# Patient Record
Sex: MALE | Race: WHITE | NOT HISPANIC OR LATINO | Employment: OTHER | URBAN - METROPOLITAN AREA
[De-identification: names, ages, dates, MRNs, and addresses within clinical notes are randomized per-mention and may not be internally consistent; named-entity substitution may affect disease eponyms.]

---

## 2023-11-15 ENCOUNTER — FOLLOW-UP (OUTPATIENT)
Dept: OPHTHALMOLOGY | Facility: CLINIC | Age: 73
End: 2023-11-15
Payer: MEDICARE

## 2023-11-15 DIAGNOSIS — H35.3211 EXUDATIVE AGE-RELATED MACULAR DEGENERATION OF RIGHT EYE WITH ACTIVE CHOROIDAL NEOVASCULARIZATION (MULTI): Primary | ICD-10-CM

## 2023-11-15 DIAGNOSIS — H46.9 OPTIC NEUROPATHY, RIGHT: ICD-10-CM

## 2023-11-15 PROCEDURE — 99213 OFFICE O/P EST LOW 20 MIN: CPT

## 2023-11-15 PROCEDURE — 92134 CPTRZ OPH DX IMG PST SGM RTA: CPT | Mod: BILATERAL PROCEDURE

## 2023-11-15 RX ORDER — CLOPIDOGREL BISULFATE 75 MG/1
TABLET, FILM COATED ORAL
COMMUNITY
Start: 2023-10-06

## 2023-11-15 RX ORDER — METOPROLOL TARTRATE 100 MG/1
100 TABLET ORAL EVERY 12 HOURS
COMMUNITY
Start: 2023-11-02

## 2023-11-15 RX ORDER — GLIMEPIRIDE 1 MG/1
1 TABLET ORAL
COMMUNITY
Start: 2023-11-07

## 2023-11-15 RX ORDER — ROSUVASTATIN CALCIUM 20 MG/1
20 TABLET, COATED ORAL
COMMUNITY
Start: 2023-11-02

## 2023-11-15 ASSESSMENT — VISUAL ACUITY
OD_CC: CF 1FT
METHOD: SNELLEN - LINEAR
CORRECTION_TYPE: GLASSES
OS_CC: 20/30-2

## 2023-11-15 ASSESSMENT — CONF VISUAL FIELD
OS_SUPERIOR_TEMPORAL_RESTRICTION: 0
OD_SUPERIOR_TEMPORAL_RESTRICTION: 0
OS_INFERIOR_NASAL_RESTRICTION: 0
OS_NORMAL: 1
OD_INFERIOR_NASAL_RESTRICTION: 0
OD_NORMAL: 1
OS_SUPERIOR_NASAL_RESTRICTION: 0
OS_INFERIOR_TEMPORAL_RESTRICTION: 0
OD_SUPERIOR_NASAL_RESTRICTION: 0
OD_INFERIOR_TEMPORAL_RESTRICTION: 0

## 2023-11-15 ASSESSMENT — CUP TO DISC RATIO
OD_RATIO: 0.3
OS_RATIO: 0.3

## 2023-11-15 ASSESSMENT — TONOMETRY
IOP_METHOD: GOLDMANN APPLANATION
OS_IOP_MMHG: 19
OD_IOP_MMHG: 19

## 2023-11-15 ASSESSMENT — SLIT LAMP EXAM - LIDS
COMMENTS: NORMAL
COMMENTS: NORMAL

## 2023-11-15 ASSESSMENT — EXTERNAL EXAM - RIGHT EYE: OD_EXAM: NORMAL

## 2023-11-15 ASSESSMENT — EXTERNAL EXAM - LEFT EYE: OS_EXAM: NORMAL

## 2023-11-15 NOTE — PROGRESS NOTES
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   - stable exam today with worsening SRF.    - Discussed diagnosis with patient, as well as need for treatment. Extensive discussion that goal is maintenance of current vision, not improvement in vision.  - OD s/p CHUN OD 4/26/23  - OD s/p IVV 6/14/23, 07/26/23   - stable - patient noted minimal improvement with most recent IVV    OCT macula  OD subfoveal PED, with stable persistent SRF, scattered drusen  OS scattered drusen, no IRF/SRF      OCT-A (prior)   OD notched PED with low-grade flow in the notch, c/w chronic CNVM   OS within normal limits    #history of optic neuropathy  - questionable based on clinical exam of optic nerve  - patient does not recall inciting event but endorses 4-5 year vision decline    Plan:  - discussed options with patient - given minimal symptomatic improvement and stability of OCT findings over the past 4 months without injection, will elect to defer  - vision is CF OD, and with optic nerve pallor - possible history of optic neuropathy which is underlying cause of vision loss  - follow-up in 6 weeks with Dr. Salazar - for possible re-injection vs observation  - continue AREDS2 and [employer] testing

## 2023-12-26 NOTE — PROGRESS NOTES
#Wet AMD, OD H35.3218   - Previously followed at [facility], without records available for review.   - stable exam today with worsening SRF.    - Discussed diagnosis with patient, as well as need for treatment. Extensive discussion that goal is maintenance of current vision, not improvement in vision.  - OD s/p CHUN OD 4/26/23  - OD s/p IVV 6/14/23, 07/26/23   - stable - patient noted minimal improvement with most recent IVV    OCT macula  OD subfoveal PED, with stable persistent SRF, scattered drusen  OS scattered drusen, no IRF/SRF      OCT-A (prior)   OD notched PED with low-grade flow in the notch, c/w chronic CNVM   OS within normal limits    #history of optic neuropathy  - questionable based on clinical exam of optic nerve  - patient does not recall inciting event but endorses 4-5 year vision decline    Plan:  - discussed options with patient - given minimal symptomatic improvement and stability of OCT findings over the past 4 months without injection, will elect to defer  - vision is CF OD, and with optic nerve pallor - possible history of optic neuropathy which is underlying cause of vision loss  - follow-up in 6 weeks with Dr. Salazar - for possible re-injection vs observation  - continue AREDS2 and [employer] testing

## 2023-12-27 ENCOUNTER — APPOINTMENT (OUTPATIENT)
Dept: OPHTHALMOLOGY | Facility: CLINIC | Age: 73
End: 2023-12-27
Payer: MEDICARE

## 2024-01-03 ENCOUNTER — FOLLOW-UP (OUTPATIENT)
Dept: OPHTHALMOLOGY | Facility: CLINIC | Age: 74
End: 2024-01-03
Payer: MEDICARE

## 2024-01-03 DIAGNOSIS — H35.3211 EXUDATIVE AGE-RELATED MACULAR DEGENERATION OF RIGHT EYE WITH ACTIVE CHOROIDAL NEOVASCULARIZATION (MULTI): Primary | ICD-10-CM

## 2024-01-03 DIAGNOSIS — H46.9 OPTIC NEUROPATHY, RIGHT: ICD-10-CM

## 2024-01-03 PROCEDURE — 92134 CPTRZ OPH DX IMG PST SGM RTA: CPT | Mod: BILATERAL PROCEDURE | Performed by: OPHTHALMOLOGY

## 2024-01-03 PROCEDURE — 99213 OFFICE O/P EST LOW 20 MIN: CPT | Performed by: OPHTHALMOLOGY

## 2024-01-03 RX ORDER — AMLODIPINE BESYLATE 10 MG/1
TABLET ORAL
COMMUNITY
Start: 2023-11-02

## 2024-01-03 RX ORDER — ASPIRIN 81 MG/1
TABLET ORAL
COMMUNITY

## 2024-01-03 ASSESSMENT — ENCOUNTER SYMPTOMS
EYES NEGATIVE: 0
GASTROINTESTINAL NEGATIVE: 0
CARDIOVASCULAR NEGATIVE: 0
ALLERGIC/IMMUNOLOGIC NEGATIVE: 0
MUSCULOSKELETAL NEGATIVE: 0
HEMATOLOGIC/LYMPHATIC NEGATIVE: 0
CONSTITUTIONAL NEGATIVE: 0
NEUROLOGICAL NEGATIVE: 0
PSYCHIATRIC NEGATIVE: 0
ENDOCRINE NEGATIVE: 0
RESPIRATORY NEGATIVE: 0

## 2024-01-03 ASSESSMENT — CUP TO DISC RATIO
OS_RATIO: 0.3
OD_RATIO: 0.3

## 2024-01-03 ASSESSMENT — VISUAL ACUITY
METHOD: SNELLEN - LINEAR
CORRECTION_TYPE: GLASSES
OS_CC: 20/20
OD_CC: CF@1FT

## 2024-01-03 ASSESSMENT — EXTERNAL EXAM - LEFT EYE: OS_EXAM: NORMAL

## 2024-01-03 ASSESSMENT — TONOMETRY
OS_IOP_MMHG: 14
IOP_METHOD: GOLDMANN APPLANATION
OD_IOP_MMHG: 13

## 2024-01-03 ASSESSMENT — SLIT LAMP EXAM - LIDS
COMMENTS: NORMAL
COMMENTS: NORMAL

## 2024-01-03 ASSESSMENT — EXTERNAL EXAM - RIGHT EYE: OD_EXAM: NORMAL

## 2024-01-03 NOTE — PROGRESS NOTES
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    - stable exam today with stable SRF.    - Discussed diagnosis with patient, as well as need for treatment. Extensive discussion that goal is maintenance of current vision, not improvement in vision.  - OD s/p CHUN OD 4/26/23  - OD s/p IVV 6/14/23, 07/26/23   - stable - patient noted minimal improvement subjectively and no objective changes in subretinal fluid (SRF) with most recent IVV in 07/2023     OCT macula 01/03/24  OD subfoveal PED, with stable SRF, scattered drusen  OS scattered drusen, no IRF/SRF       OCT-A (prior)   OD notched PED with low-grade flow in the notch, c/w chronic CNVM   OS within normal limits     #history of optic neuropathy  - questionable based on clinical exam of optic nerve  - patient does not recall inciting event but endorses 4-5 year vision decline     Plan:  - discussed options with patient - given minimal symptomatic improvement and stability of OCT findings over the past 4 months without injection, will continue to defer  - vision is CF OD, and with optic nerve pallor - possible history of optic neuropathy which is underlying cause of vision loss  - referral to neurophthalmology for rule out (r/o) traumatic optic neuropathy.   - follow-up in 3 mos  - continue AREDS2 and [employer] testing

## 2024-01-24 ENCOUNTER — OFFICE VISIT (OUTPATIENT)
Dept: OPHTHALMOLOGY | Facility: CLINIC | Age: 74
End: 2024-01-24
Payer: MEDICARE

## 2024-01-24 DIAGNOSIS — S80.859A: ICD-10-CM

## 2024-01-24 DIAGNOSIS — H47.20 RIGHT OPTIC ATROPHY: Primary | ICD-10-CM

## 2024-01-24 PROCEDURE — 92083 EXTENDED VISUAL FIELD XM: CPT | Performed by: PSYCHIATRY & NEUROLOGY

## 2024-01-24 PROCEDURE — 92133 CPTRZD OPH DX IMG PST SGM ON: CPT | Performed by: PSYCHIATRY & NEUROLOGY

## 2024-01-24 PROCEDURE — 1160F RVW MEDS BY RX/DR IN RCRD: CPT | Performed by: PSYCHIATRY & NEUROLOGY

## 2024-01-24 PROCEDURE — 99215 OFFICE O/P EST HI 40 MIN: CPT | Performed by: PSYCHIATRY & NEUROLOGY

## 2024-01-24 PROCEDURE — 1159F MED LIST DOCD IN RCRD: CPT | Performed by: PSYCHIATRY & NEUROLOGY

## 2024-01-24 RX ORDER — LISINOPRIL AND HYDROCHLOROTHIAZIDE 20; 25 MG/1; MG/1
1 TABLET ORAL
COMMUNITY
Start: 2024-01-16

## 2024-01-24 RX ORDER — ROSUVASTATIN CALCIUM 40 MG/1
TABLET, COATED ORAL
COMMUNITY
Start: 2024-01-16

## 2024-01-24 ASSESSMENT — ENCOUNTER SYMPTOMS
CARDIOVASCULAR NEGATIVE: 0
PSYCHIATRIC NEGATIVE: 0
ENDOCRINE NEGATIVE: 0
NEUROLOGICAL NEGATIVE: 0
EYES NEGATIVE: 1
ALLERGIC/IMMUNOLOGIC NEGATIVE: 0
HEMATOLOGIC/LYMPHATIC NEGATIVE: 0
GASTROINTESTINAL NEGATIVE: 0
MUSCULOSKELETAL NEGATIVE: 0
RESPIRATORY NEGATIVE: 0
CONSTITUTIONAL NEGATIVE: 0

## 2024-01-24 ASSESSMENT — CONF VISUAL FIELD
OS_INFERIOR_NASAL_RESTRICTION: 0
OS_SUPERIOR_NASAL_RESTRICTION: 0
OD_INFERIOR_NASAL_RESTRICTION: 1
OS_NORMAL: 1
OS_INFERIOR_TEMPORAL_RESTRICTION: 0
OD_SUPERIOR_TEMPORAL_RESTRICTION: 1
OD_SUPERIOR_NASAL_RESTRICTION: 1
OS_SUPERIOR_TEMPORAL_RESTRICTION: 0
OD_INFERIOR_TEMPORAL_RESTRICTION: 1
METHOD: COUNTING FINGERS

## 2024-01-24 ASSESSMENT — REFRACTION_WEARINGRX
SPECS_TYPE: PAL
OS_SPHERE: +2.50
OD_ADD: +3.00
OS_AXIS: 105
OD_CYLINDER: -0.75
OS_CYLINDER: -0.75
OD_SPHERE: +3.00
OS_ADD: +3.00
OD_AXIS: 125

## 2024-01-24 ASSESSMENT — TONOMETRY
OS_IOP_MMHG: 12
IOP_METHOD: GOLDMANN APPLANATION
OD_IOP_MMHG: 16

## 2024-01-24 ASSESSMENT — VISUAL ACUITY
METHOD: SNELLEN - LINEAR
OS_CC: 20/20
OS_CC+: -1
OD_CC: CF @ 1FT
CORRECTION_TYPE: GLASSES

## 2024-01-24 ASSESSMENT — CUP TO DISC RATIO
OS_RATIO: 0.3
OD_RATIO: 0.3

## 2024-01-24 ASSESSMENT — SLIT LAMP EXAM - LIDS
COMMENTS: PTOSIS 3 MM
COMMENTS: NORMAL

## 2024-01-24 ASSESSMENT — EXTERNAL EXAM - RIGHT EYE: OD_EXAM: NORMAL

## 2024-01-24 ASSESSMENT — EXTERNAL EXAM - LEFT EYE: OS_EXAM: NORMAL

## 2024-01-24 NOTE — PROGRESS NOTES
Assessment and Plan    1/24/2024 Michelle OD 21 & OS 21 at base [facility].    1/24/2024 OCT RNFL OD 62 with S & I thinning & OS 92.    1/24/2024 HVF 24-2 OD stimulus V, fovea <0, generalized depression & OS fovea 37, wnl MD +1.32.    This 73 year-old man with a history of exudative age-related macular degeneration (ARMD), hypertension, hyperlipidemia, type 2 diabetes, CKD presents for evaluation of right optic neuropathy.    He has profound right optic atrophy. The differential of optic atrophy is broad including compressive lesions, optic neuritis, nutritional deficiencies, chronic/latent infections. As a diagnosis of exclusion, non-arteritic anterior ischemic optic neuropathy is a consideration, but typically has a distinct sudden loss as part of that illness. I will test for the possibilties. I also will follow over time to try to establish chronicity.     Plan    Check MRI brain & orbits with contrast  Check NMO/AQP4 & MOG antibodies, B12, folate, thiamine, syphilis antibody, T-SPOT & ACE    Follow up in 1-2 months (HVF & OCT in longer term). (Dilated 1/24/2024)  ---Addendum 2/9/2024---  I was informed that he needs imaging with X-rays before the MRI secondary to metal in his leg. He confirmed metal in the left leg calf.

## 2024-03-04 ENCOUNTER — APPOINTMENT (OUTPATIENT)
Dept: OPHTHALMOLOGY | Facility: CLINIC | Age: 74
End: 2024-03-04
Payer: MEDICARE

## 2024-04-03 ENCOUNTER — APPOINTMENT (OUTPATIENT)
Dept: OPHTHALMOLOGY | Facility: CLINIC | Age: 74
End: 2024-04-03
Payer: MEDICARE

## 2024-04-17 ENCOUNTER — APPOINTMENT (OUTPATIENT)
Dept: OPHTHALMOLOGY | Facility: CLINIC | Age: 74
End: 2024-04-17
Payer: MEDICARE

## 2024-05-14 ENCOUNTER — DOCUMENTATION (OUTPATIENT)
Dept: OPHTHALMOLOGY | Facility: HOSPITAL | Age: 74
End: 2024-05-14
Payer: MEDICARE

## 2024-05-14 ENCOUNTER — APPOINTMENT (OUTPATIENT)
Dept: CARDIOLOGY | Facility: HOSPITAL | Age: 74
End: 2024-05-14
Payer: MEDICARE

## 2024-05-14 DIAGNOSIS — H35.3211 EXUDATIVE AGE-RELATED MACULAR DEGENERATION OF RIGHT EYE WITH ACTIVE CHOROIDAL NEOVASCULARIZATION (MULTI): ICD-10-CM

## 2024-05-14 DIAGNOSIS — H47.20 RIGHT OPTIC ATROPHY: Primary | ICD-10-CM

## 2024-05-14 NOTE — PROGRESS NOTES
#Wet AMD, OD H35.3211   - Previously followed at [facility], was found to have primary optic atrophy and referred for cataract surgery.    - stable exam today with stable SRF.    - Discussed diagnosis with patient, as well as need for treatment. Extensive discussion that goal is maintenance of current vision, not improvement in vision.  - OD s/p CHUN OD 4/26/23  - OD s/p IVV 6/14/23, 07/26/23   - stable - patient noted minimal improvement subjectively and no objective changes in subretinal fluid (SRF) with most recent IVV in 07/2023     OCT macula 05/14/24  OD subfoveal PED, with stable SRF, scattered drusen  OS scattered drusen, no IRF/SRF       OCT-A (prior)   OD notched PED with low-grade flow in the notch, c/w chronic CNVM   OS within normal limits     #history of optic neuropathy  - questionable based on clinical exam of optic nerve  - patient does not recall inciting event but endorses 4-5 year vision decline     Plan:  - discussed options with patient - given minimal symptomatic improvement and stability of OCT findings over the past 4 months without injection, will continue to defer  - vision is CF OD, and with optic nerve pallor  - Being evaluated with Dr. Palma with imaging and lab testing.   - follow-up in 3 mos  - continue AREDS2 and [employer] testing

## 2024-05-15 ENCOUNTER — OFFICE VISIT (OUTPATIENT)
Dept: OPHTHALMOLOGY | Facility: CLINIC | Age: 74
End: 2024-05-15
Payer: MEDICARE

## 2024-05-15 DIAGNOSIS — H47.20 RIGHT OPTIC ATROPHY: Primary | ICD-10-CM

## 2024-05-15 DIAGNOSIS — H35.3211 EXUDATIVE AGE-RELATED MACULAR DEGENERATION OF RIGHT EYE WITH ACTIVE CHOROIDAL NEOVASCULARIZATION (MULTI): ICD-10-CM

## 2024-05-15 PROCEDURE — 99214 OFFICE O/P EST MOD 30 MIN: CPT | Performed by: OPHTHALMOLOGY

## 2024-05-15 PROCEDURE — 92134 CPTRZ OPH DX IMG PST SGM RTA: CPT | Performed by: OPHTHALMOLOGY

## 2024-05-15 ASSESSMENT — EXTERNAL EXAM - LEFT EYE: OS_EXAM: NORMAL

## 2024-05-15 ASSESSMENT — ENCOUNTER SYMPTOMS
HEMATOLOGIC/LYMPHATIC NEGATIVE: 0
RESPIRATORY NEGATIVE: 0
GASTROINTESTINAL NEGATIVE: 0
CARDIOVASCULAR NEGATIVE: 0
EYES NEGATIVE: 0
NEUROLOGICAL NEGATIVE: 0
ENDOCRINE NEGATIVE: 0
MUSCULOSKELETAL NEGATIVE: 0
ALLERGIC/IMMUNOLOGIC NEGATIVE: 0
CONSTITUTIONAL NEGATIVE: 0
PSYCHIATRIC NEGATIVE: 0

## 2024-05-15 ASSESSMENT — CUP TO DISC RATIO
OS_RATIO: 0.3
OD_RATIO: 0.3

## 2024-05-15 ASSESSMENT — VISUAL ACUITY
OS_CC: CF@4FT
METHOD: SNELLEN - LINEAR
CORRECTION_TYPE: GLASSES
OD_CC: 20/20

## 2024-05-15 ASSESSMENT — CONF VISUAL FIELD
OS_NORMAL: 1
OS_INFERIOR_NASAL_RESTRICTION: 0
OS_SUPERIOR_TEMPORAL_RESTRICTION: 0
OS_INFERIOR_TEMPORAL_RESTRICTION: 0
OS_SUPERIOR_NASAL_RESTRICTION: 0

## 2024-05-15 ASSESSMENT — TONOMETRY
OD_IOP_MMHG: 14
OS_IOP_MMHG: 14
IOP_METHOD: GOLDMANN APPLANATION

## 2024-05-15 ASSESSMENT — EXTERNAL EXAM - RIGHT EYE: OD_EXAM: NORMAL

## 2024-05-15 ASSESSMENT — SLIT LAMP EXAM - LIDS
COMMENTS: NORMAL
COMMENTS: PTOSIS 3 MM

## 2024-05-15 NOTE — PROGRESS NOTES
#Wet AMD, OD H35.9953   - Previously followed at [facility], was found to have primary optic atrophy and referred for cataract surgery.    - stable exam today with stable SRF.  Last injection 1 year ago, no treatment today   - Discussed diagnosis with patient, as well as need for treatment. Extensive discussion that goal is maintenance of current vision, not improvement in vision.  - OD s/p CHUN OD 4/26/23  - OD s/p IVV 6/14/23, 07/26/23    - continue AREDS2 and [employer] Grid testing  - RTC in 3-6 months, can transfer care to [place]      OCT macula 05/15/24  OD collapsed subfoveal PED, without SRF, scattered drusen  OS scattered drusen, no IRF/SRF       OCT-A (prior)   OD notched PED with low-grade flow in the notch, c/w chronic CNVM   OS within normal limits     #History of optic neuropathy  - patient does not recall inciting event but endorses 4-5 year vision decline  - Being evaluated with Dr. Palma with imaging and lab testing, but cancelled this  - Patient will transfer this workup to [place]     *Patient is moving to [place] and wishes to transfer apt and follow ups there. Hard copies given of patient chart to take with him.